# Patient Record
Sex: FEMALE
[De-identification: names, ages, dates, MRNs, and addresses within clinical notes are randomized per-mention and may not be internally consistent; named-entity substitution may affect disease eponyms.]

---

## 2023-05-26 ENCOUNTER — APPOINTMENT (OUTPATIENT)
Dept: NEUROSURGERY | Facility: CLINIC | Age: 63
End: 2023-05-26

## 2023-05-26 ENCOUNTER — APPOINTMENT (OUTPATIENT)
Dept: NEUROSURGERY | Facility: CLINIC | Age: 63
End: 2023-05-26
Payer: COMMERCIAL

## 2023-05-26 DIAGNOSIS — H93.A9 PULSATILE TINNITUS, UNSPECIFIED EAR: ICD-10-CM

## 2023-05-26 PROCEDURE — 99203 OFFICE O/P NEW LOW 30 MIN: CPT | Mod: 95

## 2023-05-26 NOTE — HISTORY OF PRESENT ILLNESS
[FreeTextEntry1] : Pulsatile Tinnitus [de-identified] : Chronic non-pulsatile tinnitus\par \par Six years ago developed R rhythmic sound that got progressively worse and now is felt in her whole head as a rhythmic "drum" sound\par - occasional, random\par - usually lasts for a few days, recently lasted for 2 weeks\par - no associated headaches\par - nothing provokes this\par \par Denies neck pain\par Denies headaches\par Occasional vertigo\par \par ENT: normal hearing test\par \par Allergic to PCN\par \par Brother with history of acoustic neuroma\par \par PLAN\par MRI IAC w/wo\par MRA Neck w/wo\par MRA Head w.wo\par Follow-up after the above\par \par

## 2023-05-30 PROBLEM — H93.A9 PULSATILE TINNITUS: Status: ACTIVE | Noted: 2023-05-30
